# Patient Record
(demographics unavailable — no encounter records)

---

## 2024-10-31 NOTE — PLAN
[FreeTextEntry1] : 75yo G0 LMP at 51yo PMH invasive carcinoma of R breast s/p lumpectomy and radiation presenting for annual exam.  - F/u Pap/HPV  - DEXA referral given  - Mammo scheduled for 12/2024  - Colonoscopy wnl 2019   D/w Dr. Modesto Grimm, PGY2

## 2024-10-31 NOTE — PLAN
[FreeTextEntry1] : 73yo G0 LMP at 49yo PMH invasive carcinoma of R breast s/p lumpectomy and radiation presenting for annual exam.  - F/u Pap/HPV  - DEXA referral given  - Mammo scheduled for 12/2024  - Colonoscopy wnl 2019   D/w Dr. Modesto Grimm, PGY2

## 2024-10-31 NOTE — HISTORY OF PRESENT ILLNESS
[FreeTextEntry1] : 75yo G0 LMP at 49yo PMH invasive carcinoma of R breast s/p lumpectomy and radiation presenting for annual exam. She has not had any vaginal bleeding since menopause. Denies changes in urinary/bowel habits. Reports vaginal dryness. She denies fever, chills, SOB, nausea, vomiting, constipation, diarrhea  Has been sexually active previously, last time was 18 years ago.   OB: G0  GYN: HPV+ 2019 neg for high risk genotype, denies h/o fibroids, ovarian cysts, STIs  Meds: Metoprolol, Pravastatin   HCM:  NILM/HPV detected 2019, neg for high risk genotype  DEXA 2022 Osteopenia f/u in 2 years  12/2023 mammogram [facility] 2 f/u in 1 year - scheduled for 12/26/24  Colonoscopy 2019: wnl

## 2024-10-31 NOTE — HISTORY OF PRESENT ILLNESS
[FreeTextEntry1] : 73yo G0 LMP at 49yo PMH invasive carcinoma of R breast s/p lumpectomy and radiation presenting for annual exam. She has not had any vaginal bleeding since menopause. Denies changes in urinary/bowel habits. Reports vaginal dryness. She denies fever, chills, SOB, nausea, vomiting, constipation, diarrhea  Has been sexually active previously, last time was 18 years ago.   OB: G0  GYN: HPV+ 2019 neg for high risk genotype, denies h/o fibroids, ovarian cysts, STIs  Meds: Metoprolol, Pravastatin   HCM:  NILM/HPV detected 2019, neg for high risk genotype  DEXA 2022 Osteopenia f/u in 2 years  12/2023 mammogram [facility] 2 f/u in 1 year - scheduled for 12/26/24  Colonoscopy 2019: wnl

## 2024-11-18 NOTE — PHYSICAL EXAM
[FreeTextEntry3] : hypopigmented macules on the forehead, no fluoresence with woods lamp  diffuse papules/plaques on the arms/legs with ridges of scale

## 2024-11-18 NOTE — HISTORY OF PRESENT ILLNESS
[FreeTextEntry1] : f/u spots, DSAP [de-identified] : 75 y/o F presenting for above. New white spots on the forehead X 1 month with mild associated itch. Also wants to treat DSAP.  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 Patient is from [place]; spends time there

## 2024-11-18 NOTE — ASSESSMENT
[FreeTextEntry1] : 1) Dermatitis, forehead - Differential includes petaloid seborrheic dermatitis, post-inflammatory hypopigmentation, and early vitiligo - Reviewed risks (as well as mitigation strategies for adverse drug events as applicable), benefits, and alternatives of therapy  - Start tacrolimus bid to AA, side effects discussed. Mix with Vaseline initially to decrease burning sensation if needed   2) DSAP, extremities, moderate exacerbation of chronic disease  - Reviewed risks (as well as mitigation strategies for adverse drug events as applicable), benefits, and alternatives of therapy. discussed cryotherapy and topical treatments. interested in trial of lovastatin - Start lovastatin cream ordered through [facility]  RTC 6-8 weeks

## 2025-01-23 NOTE — PLAN
[FreeTextEntry1] : Pt is a 75 y/o G0 who presents to colposcopy clinic for persistently + HR HPV (negative 16, 18, 45) since 2017. Cytology has remained wnl. 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  S/P Lumpectomy and radiation.   Colposcopy preformed. No abnormalities seen and no biopsies were indicated.  Immediate risk for EZEQUIEL 3+ is 4%.  No indication for repeat colposcopy unless cytology changes.   Dr. Salvador present.  Vielka Byrd, PGY-4

## 2025-01-23 NOTE — PROCEDURE
[Colposcopy] : Colposcopy  [Time out performed] : Pre-procedure time out performed.  Patient's name, date of birth and procedure confirmed. [Consent Obtained] : Consent obtained [Risks] : risks [Benefits] : benefits [Alternatives] : alternatives [HPV High Risk] : HPV high risk [No Premedication] : no premedication [Colposcopy Adequate] : colposcopy adequate [No Abnormalities] : no abnormalities [Tolerated Well] : the patient tolerated the procedure well [Pap Performed] : pap not performed [Biopsy] : biopsy not taken [de-identified] : Small pinpoint cervix with no concerning changes.

## 2025-01-23 NOTE — PLAN
[FreeTextEntry1] : Pt is a 75 y/o G0 who presents to colposcopy clinic for persistently + HR HPV (negative 16, 18, 45) since 2017. Cytology has remained wnl. History significant for Stage 1a ductal carcinoma ER/AK + HER 2 -.  S/P Lumpectomy and radiation.   Colposcopy preformed. No abnormalities seen and no biopsies were indicated.  Immediate risk for EZEQUIEL 3+ is 4%.  No indication for repeat colposcopy unless cytology changes.   Dr. Salvador present.  Vielka Byrd, PGY-4

## 2025-02-10 NOTE — PHYSICAL EXAM
[FreeTextEntry3] : hypopigmented macules on the forehead, mild positive fluoresence with woods lamp also with involvement of frontal hairline diffuse papules/plaques on the arms/legs with ridges of scale

## 2025-02-10 NOTE — ASSESSMENT
[FreeTextEntry1] : 1) Hypopigmented patches, forehead -Initial differential includes petaloid seborrheic dermatitis, post-inflammatory hypopigmentation vs vitiligo vs scar of unknown etiology - Partially fluoresces today on woods lamp. Still unclear diagnosis, though I slightly favor scar over vitiligo. We discussed a biopsy may be considered to assess for scar formation, though the patient declines as she is not interested in treatment  - Reviewed risks (as well as mitigation strategies for adverse drug events as applicable), benefits, and alternatives of therapy. Can trial treatment for vitiligo vs other inflammatory process yet patient is not bothered by the spots and is able to cover with makeup. Not worsening per her report. - Previously sent rx for tacrolimus though unclear if this was covered. Will hold off for now   2) DSAP, extremities, moderate exacerbation of chronic disease  - Failed fluorouracil, topical steroids (though unclear which ones) - Reviewed risks (as well as mitigation strategies for adverse drug events as applicable), benefits, and alternatives of therapy. discussed cryotherapy and topical treatments. interested in trial of lovastatin. declines cryotherapy  - Start lovastatin cream ordered through [facility]; tried to get at , contacted [facility] to f/u again with patient and check on insurance coverage.   RTC 3 months or sooner PRN

## 2025-06-09 NOTE — ASSESSMENT
[FreeTextEntry1] : 74 yo F with a hx of SCC (ear), invasive ductal carcinoma s/p R lumpectomy (2016), HTN, R shoulder OA, pre-DM, morbid obesity, HLD, varicose veins, osteopenia, herpes zoster c/b post herpetic neuralgia presenting today for medication refills.   #HTN - BP well controlled on metoprolol 25 mg - Will refill home metoprolol tartrate 25mg QD   #HLD - Will refill home pravastatin 40mg Qhs  -Will check lipid profile  #HCM -lipid panel, A1c, CMP today -ophtho referral  -metoprolol and pravastatin

## 2025-06-09 NOTE — HISTORY OF PRESENT ILLNESS
[de-identified] : 73Y F w/ SCC (ear), invasive ductal carcinoma s/p R. lumpectomy (2016), HTN, pre-DM, morbid obesity, HLD, varicose veins presents for RPA for medication renewal. Patient will be abroad for several months ([place] until Sept) and is requesting enough medication for then. Patient is currently metoprolol 25 and pravastatin 40 mg. Patient has no acute complaints. Patient has not experienced any vision loss but was wondering if she would need vision testing as she had visited an ophthalmologist 2 years prior.

## 2025-06-09 NOTE — HISTORY OF PRESENT ILLNESS
[de-identified] : 73Y F w/ SCC (ear), invasive ductal carcinoma s/p R. lumpectomy (2016), HTN, pre-DM, morbid obesity, HLD, varicose veins presents for RPA for medication renewal. Patient will be abroad for several months ([place] until Sept) and is requesting enough medication for then. Patient is currently metoprolol 25 and pravastatin 40 mg. Patient has no acute complaints. Patient has not experienced any vision loss but was wondering if she would need vision testing as she had visited an ophthalmologist 2 years prior.

## 2025-06-09 NOTE — PHYSICAL EXAM
[No Acute Distress] : no acute distress [Well Nourished] : well nourished [Well Developed] : well developed [Well-Appearing] : well-appearing [Normal Sclera/Conjunctiva] : normal sclera/conjunctiva [PERRL] : pupils equal round and reactive to light [EOMI] : extraocular movements intact [Normal Outer Ear/Nose] : the outer ears and nose were normal in appearance [Normal Oropharynx] : the oropharynx was normal [Supple] : supple [No Respiratory Distress] : no respiratory distress  [Clear to Auscultation] : lungs were clear to auscultation bilaterally [Normal Rate] : normal rate  [Regular Rhythm] : with a regular rhythm [Normal S1, S2] : normal S1 and S2 [Soft] : abdomen soft [Non Tender] : non-tender [Non-distended] : non-distended [No HSM] : no HSM [Normal Bowel Sounds] : normal bowel sounds [No CVA Tenderness] : no CVA  tenderness [No Spinal Tenderness] : no spinal tenderness [No Joint Swelling] : no joint swelling [Grossly Normal Strength/Tone] : grossly normal strength/tone [No Rash] : no rash [Coordination Grossly Intact] : coordination grossly intact [No Focal Deficits] : no focal deficits [Normal Gait] : normal gait [Normal Affect] : the affect was normal [Normal Insight/Judgement] : insight and judgment were intact [de-identified] : +obesity [de-identified] : +b/l LE edema

## 2025-06-09 NOTE — PHYSICAL EXAM
[No Acute Distress] : no acute distress [Well Nourished] : well nourished [Well Developed] : well developed [Well-Appearing] : well-appearing [Normal Sclera/Conjunctiva] : normal sclera/conjunctiva [PERRL] : pupils equal round and reactive to light [EOMI] : extraocular movements intact [Normal Outer Ear/Nose] : the outer ears and nose were normal in appearance [Normal Oropharynx] : the oropharynx was normal [Supple] : supple [No Respiratory Distress] : no respiratory distress  [Clear to Auscultation] : lungs were clear to auscultation bilaterally [Normal Rate] : normal rate  [Regular Rhythm] : with a regular rhythm [Normal S1, S2] : normal S1 and S2 [Soft] : abdomen soft [Non Tender] : non-tender [Non-distended] : non-distended [No HSM] : no HSM [Normal Bowel Sounds] : normal bowel sounds [No CVA Tenderness] : no CVA  tenderness [No Spinal Tenderness] : no spinal tenderness [No Joint Swelling] : no joint swelling [Grossly Normal Strength/Tone] : grossly normal strength/tone [No Rash] : no rash [Coordination Grossly Intact] : coordination grossly intact [No Focal Deficits] : no focal deficits [Normal Gait] : normal gait [Normal Affect] : the affect was normal [Normal Insight/Judgement] : insight and judgment were intact [de-identified] : +obesity [de-identified] : +b/l LE edema

## 2025-06-12 NOTE — HISTORY OF PRESENT ILLNESS
[FreeTextEntry1] : f/u spots, DSAP [de-identified] : 75 y/o F presenting for above.   f/u for DSAP & white spots on the forehead now since Oct 2024. not bothered by spots on forehead, feels they are stable, has prev declined treatment  DSAP on the legs - trialed lovastatin for one week, did not feel it helped. may want to re-trial treatment  growth on chest, irritating. 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

## 2025-06-12 NOTE — ASSESSMENT
[FreeTextEntry1] : 1) Hypopigmented patches, forehead -Initial differential includes petaloid seborrheic dermatitis, post-inflammatory hypopigmentation vs vitiligo vs scar of unknown etiology - Partially fluoresced previously on woods lamp. Still unclear diagnosis, though scar favored over vitiligo. Discussed once again that a biopsy may be considered to assess for scar formation, though the patient declined as she is not interested in treatment  - Reviewed risks (as well as mitigation strategies for adverse drug events as applicable), benefits, and alternatives of therapy. Can trial treatment for vitiligo vs other inflammatory process yet patient is not bothered by the spots and is able to cover with makeup. Not worsening per her report. - Will continue to monitor clinically and hold off on tx for now   2) Neoplasm of Uncertain Behavior, on the central chest  Ddx inflamed seborrheic keratosis vs less likely skin tag  - Declined cryotherapy. Instead performed a skin biopsy to help confirm diagnosis and treat the lesion - Wound care instructions given. Recommended OTC vaseline for wound care  Procedure: tangential Shave bx, central chest  The risks/benefits/alternatives of skin biopsy were explained to the patient, which include and are not limited to bleeding, infection, scarring or discoloration of skin, and recurrence of lesion. Patient expressed understanding of these risks and provided consent to the procedure. Time out with verification of patient and lesion site was performed. Site was prepped with rubbing alcohol, lidocaine with epinephrine was injected for anesthesia, and biopsy was performed. Specimen sent to path. Procedure was without complication and well tolerated. Wound care was discussed.  3) DSAP, extremities, chronic, not at goal  - Failed fluorouracil, topical steroids (though unclear which ones) - Previously reviewed risks (as well as mitigation strategies for adverse drug events as applicable), benefits, and alternatives of therapy. previously discussed cryotherapy and topical treatments, pt prev declined cryotherapy  - Cont lovastatin cream to affected areas daily   RTC 3-6 months or prn

## 2025-06-12 NOTE — PHYSICAL EXAM
[FreeTextEntry3] : hypopigmented macules on the forehead, with involvement of frontal hairline diffuse papules/plaques on the arms/legs with ridges of scale  pedunculated stuck-on brown papule central chest